# Patient Record
Sex: FEMALE | Race: WHITE
[De-identification: names, ages, dates, MRNs, and addresses within clinical notes are randomized per-mention and may not be internally consistent; named-entity substitution may affect disease eponyms.]

---

## 2019-04-05 ENCOUNTER — HOSPITAL ENCOUNTER (INPATIENT)
Dept: HOSPITAL 74 - YASAS | Age: 49
Discharge: ANOTHER HOSPITAL | End: 2019-04-05
Attending: SURGERY | Admitting: SURGERY
Payer: MEDICARE

## 2019-04-05 VITALS — TEMPERATURE: 98.2 F | HEART RATE: 78 BPM | SYSTOLIC BLOOD PRESSURE: 140 MMHG | DIASTOLIC BLOOD PRESSURE: 69 MMHG

## 2019-04-05 VITALS — BODY MASS INDEX: 31.4 KG/M2

## 2019-04-05 DIAGNOSIS — F10.259: Primary | ICD-10-CM

## 2019-04-05 DIAGNOSIS — E86.0: ICD-10-CM

## 2019-04-05 DIAGNOSIS — J45.909: ICD-10-CM

## 2019-04-05 PROCEDURE — HZ2ZZZZ DETOXIFICATION SERVICES FOR SUBSTANCE ABUSE TREATMENT: ICD-10-PCS | Performed by: SURGERY

## 2019-04-05 NOTE — DS
[facility] Detox Discharge Summary


Admission Date: 


04/05/19





Discharge Date: 04/05/19





- History


Present History: Alcohol Dependence


Additional Comments: 





Alcohol withdrawal. 


Pertinent Past History: 





Hx depression. 





- Physical Exam Results


Vital Signs: 


 Vital Signs











Temperature  98.2 F   04/05/19 22:22


 


Pulse Rate  78   04/05/19 22:22


 


Respiratory Rate  18   04/05/19 22:22


 


Blood Pressure  140/69   04/05/19 22:22


 


O2 Sat by Pulse Oximetry (%)      











Pertinent Admission Physical Exam Findings: 





Patient admitted to unit, refusing medication. Behavior now resembling 

psychosis rather than alcohol intoxication or acute alcohol withdrawal. Speech 

pattern increasingly more erratic, being sexually inappropriate w/ both male 

and female patients. Signs of paranoia "people trying to touch her, looking at 

her body."   





- Medication


Discharge Medications: 


Ambulatory Orders





Albuterol Sulfate [Proventil HFA Inhaler -] 2 gm IN QID PRN 04/05/19 


Bupropion HCl [Wellbutrin Sr] 100 mg PO DAILY 04/05/19 


Dextroamphetamine/Amphetamine [Adderall Xr 30 mg Capsule] 30 mg PO DAILY 04/05/ 19 


Naltrexone HCl [Revia -] 50 mg PO DAILY 04/05/19 


Zolpidem Tartrate [Ambien] 5 mg PO HS 04/05/19 


clonazePAM [Klonopin -] 0.5 mg PO DAILY PRN 04/05/19 











- Diagnosis


(1) Alcohol dependence with uncomplicated withdrawal


Status: Acute   





(2) Asthma


Status: Chronic   


Qualifiers: 


   Asthma severity: unspecified severity   Asthma persistence: unspecified   

Asthma complication type: unspecified   Qualified Code(s): J45.909 - 

Unspecified asthma, uncomplicated   





(3) Dehydration


Status: Acute   





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


Status: Acute   


Qualifiers: 


   Complication of substance-induced condition: with unspecified complication   

Qualified Code(s): F10.959 - Alcohol use, unspecified with alcohol-induced 

psychotic disorder, unspecified   





- AMA


Did Patient Leave Against Medical Advice: No (Patient sent to [facility] ER 

for  evaluation.)

## 2019-04-05 NOTE — HP
CIWA Score


Nausea/Vomitin-Mild Nausea/No Vomiting


Muscle Tremors: 4-Moderate,w/Arms Extend


Anxiety: 7-Acute Panic/Severe


Agitation: 7-Pacing/Thrashing


Paroxysmal Sweats: 3


Orientation: 0-Oriented


Tacttile Disturbances: 0-None


Auditory Disturbances: 0-None


Visual Disturbances: 0-None


Headache: 0-None Present


CIWA-Ar Total Score: 22





- Admission Criteria


[facility] Guidelines: Admission for Medically Managed Detox: 


Requires at least one of the followin. CIWA greater than 12


2. Seizures within the past 24 hours


3. Delirium tremens within the past 24 hours


4. Hallucinations within the past 24 hours


5. Acute intervention needed for co  occurring medical disorder


6. Acute intervention needed for co  occurring psychiatric disorder


7. Severe withdrawal that cannot be handled at a lower level of care (continued


    vomiting, continued diarrhea, abnormal vital signs) requiring intravenous


    medication and/or fluids


8. Pregnancy





Patient presents the following: CIWA greater than 12


Admission Criteria Met: Admission criteria met





Admission ROS [facility]





- [facility]


Chief Complaint: 





Here with alcohol withdrawal. 


Allergies/Adverse Reactions: 


 Allergies











Allergy/AdvReac Type Severity Reaction Status Date / Time


 


No Known Allergies Allergy   Verified 19 18:25











History of Present Illness: 





States alcohol use since age 29 - 2 pints liquor.





Hx: asthma - last exacerbation 1 day ago. On meds; 





Had a drink and took 2 clonazepam 0.5 about 4 hours ago.





States years ago used to use cocaine.





Depression. Denies thoughts of harming self or others. 





Had to be coaxed into putting on hospital gown. Styates "I don't like people 

looking at me". Behavior is erratic and possibly r/t acute alcohol withdrawal.














Patient Name: Nevin Mack YOB: 1970


 


Address: [address] [place], [place] [id number] Sex: Female














 Rx Written Rx Dispensed Drug Quantity Days Supply Prescriber Name  


 


2019 zolpidem tartrate 5 mg tablet  30 30 Emilie Velazquez  


 


2019 dextroamp-amphetamin 10 mg tab  30 30 Emilie Velazquez  


 


2019 dextroamp-amphet er 30 mg cap  60 30 Emilie Velazquez  


 


2019 clonazepam 0.5 mg tablet  30 30 Marcus, Emilie   


 


2019 dextroamp-amphetamin 10 mg tab  30 30 Marcus, Emilie   


 


2019 dextroamp-amphet er 30 mg cap  60 30 Marcus, Emilie   


 


2019 clonazepam 0.5 mg tablet  30 30 Marcus, Emilie   


 


2019 zolpidem tartrate 5 mg tablet  30 30 Marcus, Emilie   


 


2019 zolpidem tartrate 5 mg tablet  30 30 Marcus, Emilie   


 


2019 clonazepam 0.5 mg tablet  30 30 Marcus, Emilie   


 


2019 dextroamp-amphetamin 10 mg tab  30 30 Marcus, Emilie   


 


2019 dextroamp-amphet er 30 mg cap  60 30 Marcus, Emilie   


 


2019 zolpidem tartrate 5 mg tablet  30 30 Marcus, Emilie   


 


2019 dextroamp-amphetamin 10 mg tab  30 30 Marcus, Emilie   


 


2019 dextroamp-amphet er 30 mg cap  60 30 Marcus, Emilie   


 


2019 clonazepam 0.5 mg tablet  30 30 Marcus, Emilie   


 


2018 clonazepam 0.5 mg tablet  7 7 ArguelloGenaMichelle  


 


2018 zolpidem tartrate 5 mg tablet  7 7 Arguello Michelle  


 


2018 dextroamp-amphetamin 10 mg tab  7 7 ArguelloGenaMichelle  


 


2018 dextroamp-amphet er 30 mg cap  14 7 ArguelloCarol Ann ramseyh  


 


10/25/2018 2018 zolpidem tartrate 5 mg tablet  30 30 Marcus, Emilie   


 


2018 dextroamp-amphet er 30 mg cap  60 30 Marcus, Emilie   


 


2018 clonazepam 0.5 mg tablet  30 30 Marcus, Emilie G  


 


2018 dextroamp-amphetamin 10 mg tab  30 30 Marcus, Emilie G  


 


10/25/2018 10/29/2018 dextroamp-amphetamin 10 mg tab  30 30 Marcus, Emilie G  


 


10/25/2018 10/29/2018 dextroamp-amphet er 30 mg cap  60 30 Marcus, Emilie G  


 


10/25/2018 10/29/2018 clonazepam 0.5 mg tablet  30 30 Marcus, Emilie G  


 


10/25/2018 10/29/2018 zolpidem tartrate 5 mg tablet  30 30 Marcus, Emilie G  


 


10/03/2018 10/05/2018 clonazepam 0.5 mg tablet  30 30 Marcus, Emilie G  


 


10/03/2018 10/05/2018 zolpidem tartrate 5 mg tablet  30 30 Marcus, Emilie G  


 


10/03/2018 10/05/2018 dextroamp-amphetamin 10 mg tab  30 30 Marcus, Emilie G  


 


10/03/2018 10/05/2018 dextroamp-amphet er 30 mg cap  60 30 Marcus, Emilie G  


 


08/10/2018 2018 zolpidem tartrate 5 mg tablet  30 30 Marcus, Emilie G  


 


2018 dextroamp-amphetamin 10 mg tab  30 30 Marcus, Emilie G  


 


2018 dextroamp-amphet er 30 mg cap  60 30 Marcus, Emilie G  


 


2018 clonazepam 0.5 mg tablet  30 30 Marcus, Emilie G  


 


08/10/2018 08/10/2018 dextroamp-amphetamin 10 mg tab  30 30 Marcus, Emilie G  


 


08/10/2018 08/10/2018 dextroamp-amphet er 30 mg cap  60 30 Marcus, Emilie G  


 


08/10/2018 08/10/2018 zolpidem tartrate 5 mg tablet  30 30 Marcus, Emilie G  


 


08/10/2018 08/10/2018 clonazepam 0.5 mg tablet  30 30 Marcus, Emilie G  


 


2018 dextroamp-amphetamin 10 mg tab  30 30 Marcus, Emilie G  


 


2018 zolpidem tartrate 5 mg tablet  30 30 Marcus, Emilie G  


 


2018 dextroamp-amphet er 30 mg cap  60 30 Marcus, Emilie G  


 


2018 zolpidem tartrate 5 mg tablet  30 30 Marcus, Emilie G  


 


2018 dextroamp-amphetamin 10 mg tab  30 30 Marcus, Emilie G  


 


2018 dextroamp-amphet er 30 mg cap  60 30 Marcus, Emilie G  


 


2018 dextroamp-amphetamin 10 mg tab  30 30 Marcus, Emilie G  


 


2018 dextroamp-amphet er 30 mg cap  60 30 Marcus, Emilie G  


 


2018 zolpidem tartrate 5 mg tablet  30 30 Marcus, Emilie COLMENARES  


 


2018 zolpidem tartrate 5 mg tablet  30 30 Marcus, Emilie COLMENARES  


 


2018 dextroamp-amphetamin 10 mg tab  30 30 Marcus, Emilie COLMENARES  


 


2018 dextroamp-amphet er 30 mg cap  60 30 Marcus, Emilie COLMENARES  











Exam Limitations: Intoxication (Intoxication vs acute withdrawal.)





- Ebola screening


Have you traveled outside of the country in the last 21 days: No


Have you had contact with anyone from an Ebola affected area: No


Do you have a fever: No





- Review of Systems


Constitutional: Changes in sleep (Difficulty falling and staying asleep)


EENT: reports: Blurred Vision, Nose Congestion (Perforated nasal septum from 

past cocaine use)


Respiratory: reports: No Symptoms reported


Cardiac: reports: No Symptoms Reported


GI: reports: No Symptoms Reported


: reports: No Symptoms Reported


Musculoskeletal: reports: No Symptoms Reported


Integumentary: reports: No Symptoms Reported


Neuro: reports: Tremors


Endocrine: reports: Increased Thirst


Hematology: reports: No Symptoms Reported


Psychiatric: reports: Judgement Intact, Agitated, Anxious, Depressed (

Depression. Denies thoughts of harming self or others)





Patient History





- PPD History


Previous Implant?: Yes


Documented Results: Negative w/o proof


Implanted On Prior R Admission?: No


PPD to be Administered?: Yes





- Reproductive History


Patient is a Female of Child Bearing Age (11 -55 yrs old): Yes


Last Menstrual Period: 19


Patient Pregnant: No





- Smoking Cessation


Smoking history: Never smoked


Have you smoked in the past 12 months: No


Hx Chewing Tobacco Use: No


Initiated information on smoking cessation: No





- Substance & Tx. History


Hx Alcohol Use: Yes


Substance Use Type: Alcohol


Hx Substance Use Treatment: Yes (detox, rehab)





- Substances abused


  ** Alcohol


Substance route: Oral


Frequency: Daily


Amount used: 2 pints


Age of first use: 29


Date of last use: 19





Admission Physical Exam [facility]





- Vital Signs


Vital Signs: 


 Vital Signs - 24 hr











  19





  18:23 18:58


 


Temperature 97.8 F 97.8 F


 


Pulse Rate 102 H 102 H


 


Respiratory 16 16





Rate  


 


Blood Pressure 137/88 137/88














- Physical


General Appearance: Yes: Nourished, Appropriately Dressed, Severe Distress, 

Intoxicated (RODO 0.134)


HEENTM: Yes: EOMI, Hearing grossly Normal, Normocephalic, TELLY, Pharynx Normal (

Thickened saliva)


Respiratory: Yes: Lungs Clear, Normal Breath Sounds, No Respiratory Distress


Neck: Yes: No masses,lesions,Nodules, Supple


Breast: Yes: Breast Exam Deferred


Cardiology: Yes: Regular Rhythm, S1, S2, Tachycardia


Abdominal: Yes: Non Tender, Soft, Increased Bowel Sounds


Back: Yes: Normal Inspection


Musculoskeletal: Yes: full range of Motion, Gait Steady


Extremities: Yes: Normal Capillary Refill, Normal Range of Motion, Non-Tender, 

Tremors


Neurological: Yes: CNs II-XII NML intact, Fully Oriented, Alert, Motor Strength 

5/5


Integumentary: Yes: Normal Color, Dry (Dry skin and lips), Warm


Lymphatic: Yes: Within Normal Limits





- Diagnostic


(1) Alcohol dependence with uncomplicated withdrawal


Current Visit: Yes   Status: Acute   





(2) Asthma


Current Visit: Yes   Status: Acute   





(3) Dehydration


Current Visit: Yes   Status: Acute   





Cleared for Admission [facility]





- Detox or Rehab


[facility] Level of Care: Medically Managed


Detox Regimen/Protocol: Librium





Breathalyzer





- Breathalyzer


Breathalyzer: 0.132





POC Urine pregnancy test





- Test device


Pregnancy test lot number: [id number]


Expiration date: 20





- Control


Pregnancy test control: Yes





- Result


Urine Pregnancy Test Results: Negative - NO line present





Urine Drug Screen





- Test Device


Lot number: [id number]


Expiration date: 20





- Control


Is test valid?: Yes





- Results


Drug screen NEGATIVE: Yes


Urine drug screen results: AMP-Amphetamines





Inpatient Rehab Admission





- Rehab Decision to Admit


Inpatient rehab admission?: No

## 2019-04-05 NOTE — PN
[facility] Progress Note


Note: 





Patient admitted to unit, refusing medication. Behavior now resembling 

psychosis rather than alcohol intoxication. Speech pattern increasingly more 

erratic, being sexually inappropriate w/ both male and female patients. Signs 

of paranoia "people trying to touch her, looking at her body."  


Patient transported , via ambulance, to [facility] w/ police support.